# Patient Record
Sex: MALE | Race: WHITE
[De-identification: names, ages, dates, MRNs, and addresses within clinical notes are randomized per-mention and may not be internally consistent; named-entity substitution may affect disease eponyms.]

---

## 2022-10-21 ENCOUNTER — HOSPITAL ENCOUNTER (EMERGENCY)
Dept: HOSPITAL 4 - SED | Age: 57
Discharge: HOME | End: 2022-10-21
Payer: COMMERCIAL

## 2022-10-21 VITALS — WEIGHT: 165 LBS | HEIGHT: 73 IN | BODY MASS INDEX: 21.87 KG/M2

## 2022-10-21 VITALS — SYSTOLIC BLOOD PRESSURE: 132 MMHG

## 2022-10-21 VITALS — SYSTOLIC BLOOD PRESSURE: 121 MMHG

## 2022-10-21 DIAGNOSIS — Z43.1: Primary | ICD-10-CM

## 2022-10-21 DIAGNOSIS — Z79.899: ICD-10-CM

## 2022-10-21 PROCEDURE — 74240 X-RAY XM UPR GI TRC 1CNTRST: CPT

## 2022-10-21 PROCEDURE — 43762 RPLC GTUBE NO REVJ TRC: CPT

## 2022-10-21 PROCEDURE — 99284 EMERGENCY DEPT VISIT MOD MDM: CPT

## 2022-10-21 NOTE — NUR
Patient given written and verbal discharge instructions and verbalizes 
understanding.  ER MD discussed with patient the results and treatment 
provided. Patient in stable condition. ID arm band removed. IV catheter removed 
intact and dressing applied, no active bleeding.

Patient educated on pain management and to follow up with PMD. Pain Scale 0/10

Opportunity for questions provided and answered. Medication side effect fact 
sheet provided.

## 2022-10-21 NOTE — NUR
Placed in room 1  . Placed on cardiac monitor, blood pressure machine and pulse 
oximeter. To gown for exam. Side rails up.

Report given to KERI HARRIS AND KERI JACKSON.

## 2022-11-22 ENCOUNTER — HOSPITAL ENCOUNTER (EMERGENCY)
Dept: HOSPITAL 4 - SED | Age: 57
Discharge: HOME | End: 2022-11-22
Payer: COMMERCIAL

## 2022-11-22 VITALS — BODY MASS INDEX: 21.87 KG/M2 | HEIGHT: 73 IN | WEIGHT: 165 LBS

## 2022-11-22 VITALS — SYSTOLIC BLOOD PRESSURE: 129 MMHG

## 2022-11-22 VITALS — SYSTOLIC BLOOD PRESSURE: 133 MMHG

## 2022-11-22 DIAGNOSIS — K94.23: Primary | ICD-10-CM

## 2022-11-22 DIAGNOSIS — Z79.899: ICD-10-CM

## 2022-11-22 PROCEDURE — 99284 EMERGENCY DEPT VISIT MOD MDM: CPT

## 2022-11-22 PROCEDURE — 43762 RPLC GTUBE NO REVJ TRC: CPT

## 2022-11-22 PROCEDURE — 74018 RADEX ABDOMEN 1 VIEW: CPT
